# Patient Record
(demographics unavailable — no encounter records)

---

## 2025-01-28 NOTE — CONSULT LETTER
[Dear  ___] : Dear  [unfilled], [Consult Letter:] : I had the pleasure of evaluating your patient, [unfilled]. [Please see my note below.] : Please see my note below. [Consult Closing:] : Thank you very much for allowing me to participate in the care of this patient.  If you have any questions, please do not hesitate to contact me. [Sincerely,] : Sincerely, [FreeTextEntry3] : Debi Nielsen MD\par  Director, Pediatric Endocrinology\par  [facility]\par  [facility]\par

## 2025-01-28 NOTE — HISTORY OF PRESENT ILLNESS
[FreeTextEntry2] : Narendra Denise) is a 11yo M here for follow up of obesity, acanthosis and abnormal HbA1C.  Last seen 11/2023.  There has been concerned since 5yo regarding excessive weight gain.  Continues to gain weight.  Mother cut portion sizes but still a lot - says mostly enjoys food rather than hungry, just keeps going back for more.  Saw nutritionist last 9/2024.   Diet -  more protein, less bread and pasta, smaller portions but still a lot, less unhealthy snacks, more fruit. Snacks a lot still during computer games.  More vegs.  No longer tired, not falling asleep in school.  Sleeps better, less waking up.  Does not snore.    Additional concern is elevated cholesterol.  Does not adrian, does cook with palm oil but less, using more canola oil.  No red meat, nonfat milk 1-2cups/day.  No fish.  Finally, low vitamin D.  Prescribed D.  Drinks a lot of milk.  Activity - Soccer, pickleball, football every day at school and after school. 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  Does also football and does kickboxing. [TWNoteComboBox1] : obesity

## 2025-01-28 NOTE — DISCUSSION/SUMMARY
[FreeTextEntry1] : Mo is a 13yo who is obese with acanthosis and borderline HbA1C suggesting risk of type 2 diabetes. We have again discussed dietary changes and focused on mindful eating. We discussed continuing his daily physical activity of multiple sports.   Mo has hypercholesterolemia.  Have cut down on palm oil and fried food. Slightly improved LDL.  TO continue effort.  Finally, vitamin D deficiency s/p supplementation, level normalized.  To continue 2 cups nonfat milk daily.

## 2025-01-28 NOTE — PHYSICAL EXAM
[Healthy Appearing] : healthy appearing [Well Nourished] : well nourished [Interactive] : interactive [Obese] : obese [Acanthosis Nigricans___] : acanthosis nigricans over [unfilled] [Normal Appearance] : normal appearance [WNL for age] : within normal limits of age [Normal S1 and S2] : normal S1 and S2 [Clear to Ausculation Bilaterally] : clear to auscultation bilaterally [Abdomen Soft] : soft [Abdomen Tenderness] : non-tender [] : no hepatosplenomegaly [3] : was Justo stage 3 [Normal] : normal  [Testes] : normal [___] : [unfilled]  [Goiter] : no goiter [Murmur] : no murmurs [de-identified] : weight gain 7kg/4m [de-identified] : normal oropharynx [de-identified] : normal patellar DTRs

## 2025-01-28 NOTE — FAMILY HISTORY
[___ inches] : [unfilled] inches [de-identified] : Turkish [FreeTextEntry1] : Citizen of [place] (living there for now) [FreeTextEntry5] : 12 [FreeTextEntry2] : none

## 2025-01-28 NOTE — ASSESSMENT
[FreeTextEntry1] : Follow-up nutritionist  Behavior modification: no food during screen time for snacks must measure out 1 portion (according to label) and put the rest away

## 2025-01-28 NOTE — PAST MEDICAL HISTORY
[At ___ Weeks Gestation] : at [unfilled] weeks gestation [Normal Vaginal Route] : by normal vaginal route [None] : there were no delivery complications [Age Appropriate] : age appropriate developmental milestones met [FreeTextEntry1] : 59l5oz

## 2025-01-28 NOTE — REVIEW OF SYSTEMS
[Nl] : Neurological [Wgt Gain (___ Lbs)] : recent [unfilled] lb weight gain [Change in Activity] : no change in activity [Sleep Disturbances] : ~T no sleep disturbances [Urinary Frequency] : no urinary frequency [Cold Intolerance] : cold tolerant

## 2025-01-28 NOTE — DISCUSSION/SUMMARY
[FreeTextEntry1] : In summary, PAUL is a 12 year old male here for elevated blood pressure. His physical exam is normal. His EKG shows sinus rhythm, and his echocardiogram shows normal intracardiac anatomy with good biventricular systolic function and no effusion. Given these results and his clinical presentation, I provided reassurance and explained that PAUL has a structurally normal heart.   We discussed the importance of dietary changes and lifestyle intervention.  I recommended evaluation by nephrology which will include ambulatory blood pressure monitoring.  If his blood pressure remains elevated, I would recommend cardiac evaluation again in 1 year.   Plan: - Nephrology referral. ABPM. - Next visit in 1 year, sooner if clinical concerns. - No activity restrictions. - No SBE prophylaxis.     Please do not hesitate to contact me if you have any questions.   Gurpreet Flores MD, MS, FAAP, FACC Attending Physician, Pediatric Cardiology [facility] Physician [address], [address] [place] [address] Office: [phone] Fax: [phone] Email: [email or web address]     I have spent 50 minutes of time on the encounter excluding separately reported services.

## 2025-01-28 NOTE — HISTORY OF PRESENT ILLNESS
[FreeTextEntry1] : Dear Dr. TALISHA OAKLEY,   I had the pleasure of seeing your patient, PAUL CASTRO, in my office today, 01/28/2025. As you know, he is a 12 year old male referred to pediatric cardiology due to elevated blood pressure.    Paul has a history of dyslipidemia and prediabetes.  He was referred for elevated blood pressure.  Based on his chart review, his systolic blood pressure at well visits tends to be around 120-128 mm Hg.  He has been meeting with a nutritionist and is attempting lifestyle interventions including diet changes. He reports limiting junk food and exercising daily. There is no history of chest pain, palpitations, SOB, headaches, vision changes, dizziness, or syncope. No fevers or URI symptoms. No family history of congenital heart disease or sudden/unexplained death. No family member with a known genetic syndrome.